# Patient Record
Sex: FEMALE | Race: OTHER | HISPANIC OR LATINO | URBAN - METROPOLITAN AREA
[De-identification: names, ages, dates, MRNs, and addresses within clinical notes are randomized per-mention and may not be internally consistent; named-entity substitution may affect disease eponyms.]

---

## 2020-07-08 ENCOUNTER — OFFICE VISIT (OUTPATIENT)
Dept: URBAN - METROPOLITAN AREA CLINIC 63 | Facility: CLINIC | Age: 55
End: 2020-07-08

## 2020-07-28 LAB — HELICOBACTER PYLORI, UREA BREATH TEST: NOT DETECTED

## 2020-08-31 ENCOUNTER — OFFICE VISIT (OUTPATIENT)
Dept: URBAN - METROPOLITAN AREA CLINIC 63 | Facility: CLINIC | Age: 55
End: 2020-08-31

## 2020-10-12 ENCOUNTER — OFFICE VISIT (OUTPATIENT)
Dept: URBAN - METROPOLITAN AREA CLINIC 60 | Facility: CLINIC | Age: 55
End: 2020-10-12

## 2020-11-04 ENCOUNTER — OFFICE VISIT (OUTPATIENT)
Dept: URBAN - METROPOLITAN AREA CLINIC 63 | Facility: CLINIC | Age: 55
End: 2020-11-04

## 2020-11-19 LAB
% SATURATION: (no result)
A-1 ANTITRYPSIN MUTATION: (no result)
ABSOLUTE BASOPHILS: (no result)
ABSOLUTE EOSINOPHILS: (no result)
ABSOLUTE LYMPHOCYTES: (no result)
ABSOLUTE MONOCYTES: (no result)
ABSOLUTE NEUTROPHILS: (no result)
ALBUMIN/GLOBULIN RATIO: (no result)
ALBUMIN: (no result)
ALKALINE PHOSPHATASE: (no result)
ALPHA FETOPROTEIN, TUMOR MARKER: (no result)
ALT: (no result)
AST: (no result)
BASOPHILS: (no result)
BILIRUBIN, TOTAL: (no result)
BUN/CREATININE RATIO: (no result)
CALCIUM: (no result)
CARBON DIOXIDE: (no result)
CERULOPLASMIN: (no result)
CHLORIDE: (no result)
CREATININE: (no result)
EGFR AFRICAN AMERICA: (no result)
EGFR NON-AFR. AMERICAN: (no result)
EOSINOPHILS: (no result)
FERRITIN: (no result)
GLOBULIN: (no result)
GLUCOSE: (no result)
HCV RNA, QUANTITATIVE REAL TI: (no result)
HCV RNA, QUANTITATIVE REAL TIME: (no result)
HEMATOCRIT: (no result)
HEMOGLOBIN: (no result)
HEPATITIS A AB, TOTAL: REACTIVE
HEPATITIS A IGM: (no result)
HEPATITIS B CORE AB TOTAL: (no result)
HEPATITIS B CORE ANTIBODY (IGM): (no result)
HEPATITIS B SURFACE ANTIBODY QL: (no result)
HEPATITIS B SURFACE ANTIGEN: (no result)
IMMUNOGLOBULIN A: (no result)
IMMUNOGLOBULIN G: (no result)
IMMUNOGLOBULIN M: (no result)
INR: 0.9
IRON BINDING CAPACITY: (no result)
IRON, TOTAL: (no result)
LYMPHOCYTES: (no result)
Lab: (no result)
MCH: (no result)
MCHC: (no result)
MCV: (no result)
MITOCHONDRIAL AB SCREEN: NEGATIVE
MONOCYTES: (no result)
MPV: (no result)
NEUTROPHILS: (no result)
PLATELET COUNT: (no result)
POTASSIUM: (no result)
PROTEIN, TOTAL: (no result)
PT: (no result)
RDW: (no result)
RED BLOOD CELL COUNT: (no result)
REFERRING PHYSICIAN: (no result)
SMOOTH MUSCLE AB SCREEN: NEGATIVE
SODIUM: (no result)
UREA NITROGEN (BUN): (no result)
WHITE BLOOD CELL COUNT: (no result)

## 2021-01-08 ENCOUNTER — OFFICE VISIT (OUTPATIENT)
Dept: URBAN - METROPOLITAN AREA SURGERY CENTER 4 | Facility: SURGERY CENTER | Age: 56
End: 2021-01-08

## 2021-01-22 ENCOUNTER — OFFICE VISIT (OUTPATIENT)
Dept: URBAN - METROPOLITAN AREA CLINIC 63 | Facility: CLINIC | Age: 56
End: 2021-01-22

## 2021-01-28 ENCOUNTER — OFFICE VISIT (OUTPATIENT)
Dept: URBAN - METROPOLITAN AREA SURGERY CENTER 4 | Facility: SURGERY CENTER | Age: 56
End: 2021-01-28

## 2021-02-12 ENCOUNTER — OFFICE VISIT (OUTPATIENT)
Dept: URBAN - METROPOLITAN AREA CLINIC 63 | Facility: CLINIC | Age: 56
End: 2021-02-12

## 2021-03-12 ENCOUNTER — OFFICE VISIT (OUTPATIENT)
Dept: URBAN - METROPOLITAN AREA CLINIC 63 | Facility: CLINIC | Age: 56
End: 2021-03-12

## 2021-05-07 ENCOUNTER — OFFICE VISIT (OUTPATIENT)
Dept: URBAN - METROPOLITAN AREA SURGERY CENTER 4 | Facility: SURGERY CENTER | Age: 56
End: 2021-05-07

## 2021-05-10 LAB — PATHOLOGY (INDENTED REPORT): (no result)

## 2021-05-21 ENCOUNTER — OFFICE VISIT (OUTPATIENT)
Dept: URBAN - METROPOLITAN AREA CLINIC 63 | Facility: CLINIC | Age: 56
End: 2021-05-21

## 2022-06-22 ENCOUNTER — OFFICE VISIT (OUTPATIENT)
Dept: URBAN - METROPOLITAN AREA CLINIC 63 | Facility: CLINIC | Age: 57
End: 2022-06-22

## 2022-06-22 ENCOUNTER — OFFICE VISIT (OUTPATIENT)
Dept: URBAN - METROPOLITAN AREA CLINIC 60 | Facility: CLINIC | Age: 57
End: 2022-06-22

## 2022-07-09 ENCOUNTER — TELEPHONE ENCOUNTER (OUTPATIENT)
Dept: URBAN - METROPOLITAN AREA CLINIC 121 | Facility: CLINIC | Age: 57
End: 2022-07-09

## 2022-07-09 RX ORDER — HYDROCHLOROTHIAZIDE 25 MG/1
TABLET ORAL ONCE A DAY
Refills: 0 | OUTPATIENT
Start: 2019-11-25 | End: 2020-07-08

## 2022-07-09 RX ORDER — OMEPRAZOLE 20 MG/1
PRN CAPSULE, DELAYED RELEASE ORAL
Refills: 0 | OUTPATIENT
Start: 2020-09-27 | End: 2021-03-12

## 2022-07-09 RX ORDER — LEVOTHYROXINE SODIUM 25 UG/1
TABLET ORAL ONCE A DAY
Refills: 0 | OUTPATIENT
Start: 2019-11-25 | End: 2020-07-08

## 2022-07-09 RX ORDER — VALSARTAN 40 MG/1
TABLET ORAL ONCE A DAY
Refills: 0 | OUTPATIENT
Start: 2019-12-03 | End: 2020-07-08

## 2022-07-09 RX ORDER — LEVOTHYROXINE SODIUM 25 UG/1
TABLET ORAL
Refills: 0 | OUTPATIENT
Start: 2020-09-27 | End: 2022-06-22

## 2022-07-09 RX ORDER — MELOXICAM 15 MG/1
TABLET ORAL
Refills: 0 | OUTPATIENT
Start: 2020-06-30 | End: 2020-10-12

## 2022-07-09 RX ORDER — SIMVASTATIN 20 MG/1
TABLET, FILM COATED ORAL
Refills: 0 | OUTPATIENT
Start: 2020-10-11 | End: 2022-06-22

## 2022-07-09 RX ORDER — SIMVASTATIN 20 MG/1
TABLET, FILM COATED ORAL ONCE A DAY
Refills: 0 | OUTPATIENT
Start: 2019-11-25 | End: 2020-07-08

## 2022-07-10 ENCOUNTER — TELEPHONE ENCOUNTER (OUTPATIENT)
Dept: URBAN - METROPOLITAN AREA CLINIC 121 | Facility: CLINIC | Age: 57
End: 2022-07-10

## 2022-07-10 RX ORDER — OMEPRAZOLE 20 MG/1
TWICE A DAY CAPSULE, DELAYED RELEASE ORAL TWICE A DAY
Refills: 1 | Status: ACTIVE | COMMUNITY
Start: 2020-02-06

## 2022-07-10 RX ORDER — OMEPRAZOLE 20 MG/1
CAPSULE, DELAYED RELEASE ORAL
Refills: 0 | Status: ACTIVE | COMMUNITY
Start: 2021-12-06

## 2022-07-10 RX ORDER — VALSARTAN 40 MG/1
TABLET ORAL ONCE A DAY
Refills: 0 | Status: ACTIVE | COMMUNITY
Start: 2020-07-08

## 2022-07-10 RX ORDER — VALSARTAN 40 MG/1
TABLET ORAL
Refills: 0 | Status: ACTIVE | COMMUNITY
Start: 2020-10-11

## 2022-07-10 RX ORDER — LEVOTHYROXINE SODIUM 25 UG/1
TABLET ORAL ONCE A DAY
Refills: 0 | Status: ACTIVE | COMMUNITY
Start: 2020-07-08

## 2022-07-10 RX ORDER — SIMVASTATIN 20 MG/1
TABLET, FILM COATED ORAL ONCE A DAY
Refills: 0 | Status: ACTIVE | COMMUNITY
Start: 2020-07-08

## 2022-07-10 RX ORDER — HYDROCHLOROTHIAZIDE 25 MG/1
TABLET ORAL ONCE A DAY
Refills: 0 | Status: ACTIVE | COMMUNITY
Start: 2020-07-08

## 2022-07-10 RX ORDER — LEVOTHYROXINE SODIUM 75 UG/1
TABLET ORAL
Refills: 0 | Status: ACTIVE | COMMUNITY
Start: 2022-05-21

## 2022-07-10 RX ORDER — AMOXICILLIN 500 MG/1
2 TWICE A DAY CAPSULE ORAL
Refills: 0 | Status: ACTIVE | COMMUNITY
Start: 2020-02-05

## 2022-07-10 RX ORDER — CLARITHROMYCIN 500 MG/1
TWICE A DAY TABLET ORAL TWICE A DAY
Refills: 0 | Status: ACTIVE | COMMUNITY
Start: 2020-02-05

## 2022-07-10 RX ORDER — HYDROCHLOROTHIAZIDE 50 MG/1
TABLET ORAL
Refills: 0 | Status: ACTIVE | COMMUNITY
Start: 2020-09-27

## 2022-07-10 RX ORDER — OMEPRAZOLE 20 MG/1
ONCE A DAY CAPSULE, DELAYED RELEASE ORAL ONCE A DAY
Refills: 1 | Status: ACTIVE | COMMUNITY
Start: 2019-12-30

## 2022-08-16 ENCOUNTER — WEB ENCOUNTER (OUTPATIENT)
Dept: URBAN - METROPOLITAN AREA SURGERY CENTER 4 | Facility: SURGERY CENTER | Age: 57
End: 2022-08-16

## 2022-08-19 ENCOUNTER — OFFICE VISIT (OUTPATIENT)
Dept: URBAN - METROPOLITAN AREA SURGERY CENTER 4 | Facility: SURGERY CENTER | Age: 57
End: 2022-08-19
Payer: COMMERCIAL

## 2022-08-19 DIAGNOSIS — K62.1 RECTAL POLYP: ICD-10-CM

## 2022-08-19 DIAGNOSIS — D12.3 POLYP OF TRANSVERSE COLON: ICD-10-CM

## 2022-08-19 DIAGNOSIS — D12.2 POLYP OF ASCENDING COLON: ICD-10-CM

## 2022-08-19 DIAGNOSIS — K57.30 DIVERTCULOSIS OF LG INT W/O PERFORATION OR ABSCESS W/O BLEEDING: ICD-10-CM

## 2022-08-19 DIAGNOSIS — Z86.010 PERSONAL HISTORY OF COLONIC POLYPS: ICD-10-CM

## 2022-08-19 PROCEDURE — 45380 COLONOSCOPY AND BIOPSY: CPT | Performed by: INTERNAL MEDICINE

## 2022-08-19 PROCEDURE — G8907 PT DOC NO EVENTS ON DISCHARG: HCPCS | Performed by: INTERNAL MEDICINE

## 2022-08-19 PROCEDURE — 45385 COLONOSCOPY W/LESION REMOVAL: CPT | Performed by: INTERNAL MEDICINE

## 2022-08-19 PROCEDURE — G8918 PT W/O PREOP ORDER IV AB PRO: HCPCS | Performed by: INTERNAL MEDICINE

## 2022-08-19 RX ORDER — VALSARTAN 40 MG/1
TABLET ORAL ONCE A DAY
Refills: 0 | Status: ACTIVE | COMMUNITY
Start: 2020-07-08

## 2022-08-19 RX ORDER — LEVOTHYROXINE SODIUM 25 UG/1
TABLET ORAL ONCE A DAY
Refills: 0 | Status: ACTIVE | COMMUNITY
Start: 2020-07-08

## 2022-08-19 RX ORDER — HYDROCHLOROTHIAZIDE 50 MG/1
TABLET ORAL
Refills: 0 | Status: ACTIVE | COMMUNITY
Start: 2020-09-27

## 2022-08-19 RX ORDER — SIMVASTATIN 20 MG/1
TABLET, FILM COATED ORAL ONCE A DAY
Refills: 0 | Status: ACTIVE | COMMUNITY
Start: 2020-07-08

## 2022-08-19 RX ORDER — LEVOTHYROXINE SODIUM 75 UG/1
TABLET ORAL
Refills: 0 | Status: ACTIVE | COMMUNITY
Start: 2022-05-21

## 2022-08-19 RX ORDER — HYDROCHLOROTHIAZIDE 25 MG/1
TABLET ORAL ONCE A DAY
Refills: 0 | Status: ACTIVE | COMMUNITY
Start: 2020-07-08

## 2022-08-19 RX ORDER — OMEPRAZOLE 20 MG/1
TWICE A DAY CAPSULE, DELAYED RELEASE ORAL TWICE A DAY
Refills: 1 | Status: ACTIVE | COMMUNITY
Start: 2020-02-06

## 2022-08-19 RX ORDER — AMOXICILLIN 500 MG/1
2 TWICE A DAY CAPSULE ORAL
Refills: 0 | Status: ACTIVE | COMMUNITY
Start: 2020-02-05

## 2022-08-19 RX ORDER — CLARITHROMYCIN 500 MG/1
TWICE A DAY TABLET ORAL TWICE A DAY
Refills: 0 | Status: ACTIVE | COMMUNITY
Start: 2020-02-05

## 2022-08-22 PROBLEM — 398050005 DIVERTICULAR DISEASE OF COLON: Status: ACTIVE | Noted: 2022-08-22

## 2022-08-26 ENCOUNTER — DASHBOARD ENCOUNTERS (OUTPATIENT)
Age: 57
End: 2022-08-26

## 2022-09-06 ENCOUNTER — OFFICE VISIT (OUTPATIENT)
Dept: URBAN - METROPOLITAN AREA TELEHEALTH 1 | Facility: TELEHEALTH | Age: 57
End: 2022-09-06

## 2022-09-06 RX ORDER — SIMVASTATIN 20 MG/1
TABLET, FILM COATED ORAL ONCE A DAY
Refills: 0 | Status: ACTIVE | COMMUNITY
Start: 2020-07-08

## 2022-09-06 RX ORDER — CLARITHROMYCIN 500 MG/1
TWICE A DAY TABLET ORAL TWICE A DAY
Refills: 0 | Status: ACTIVE | COMMUNITY
Start: 2020-02-05

## 2022-09-06 RX ORDER — OMEPRAZOLE 20 MG/1
TWICE A DAY CAPSULE, DELAYED RELEASE ORAL TWICE A DAY
Refills: 1 | Status: ACTIVE | COMMUNITY
Start: 2020-02-06

## 2022-09-06 RX ORDER — LEVOTHYROXINE SODIUM 25 UG/1
TABLET ORAL ONCE A DAY
Refills: 0 | Status: ACTIVE | COMMUNITY
Start: 2020-07-08

## 2022-09-06 RX ORDER — HYDROCHLOROTHIAZIDE 50 MG/1
TABLET ORAL
Refills: 0 | Status: ACTIVE | COMMUNITY
Start: 2020-09-27

## 2022-09-06 RX ORDER — LEVOTHYROXINE SODIUM 75 UG/1
TABLET ORAL
Refills: 0 | Status: ACTIVE | COMMUNITY
Start: 2022-05-21

## 2022-09-06 RX ORDER — HYDROCHLOROTHIAZIDE 25 MG/1
TABLET ORAL ONCE A DAY
Refills: 0 | Status: ACTIVE | COMMUNITY
Start: 2020-07-08

## 2022-09-06 RX ORDER — VALSARTAN 40 MG/1
TABLET ORAL ONCE A DAY
Refills: 0 | Status: ACTIVE | COMMUNITY
Start: 2020-07-08

## 2022-09-06 RX ORDER — AMOXICILLIN 500 MG/1
2 TWICE A DAY CAPSULE ORAL
Refills: 0 | Status: ACTIVE | COMMUNITY
Start: 2020-02-05

## 2024-09-30 ENCOUNTER — OFFICE VISIT (OUTPATIENT)
Dept: URBAN - METROPOLITAN AREA CLINIC 63 | Facility: CLINIC | Age: 59
End: 2024-09-30
Payer: COMMERCIAL

## 2024-09-30 ENCOUNTER — LAB OUTSIDE AN ENCOUNTER (OUTPATIENT)
Dept: URBAN - METROPOLITAN AREA CLINIC 63 | Facility: CLINIC | Age: 59
End: 2024-09-30

## 2024-09-30 VITALS
HEART RATE: 65 BPM | HEIGHT: 61 IN | WEIGHT: 212 LBS | OXYGEN SATURATION: 98 % | BODY MASS INDEX: 40.02 KG/M2 | DIASTOLIC BLOOD PRESSURE: 80 MMHG | SYSTOLIC BLOOD PRESSURE: 120 MMHG | TEMPERATURE: 98.3 F

## 2024-09-30 DIAGNOSIS — I10 ESSENTIAL (PRIMARY) HYPERTENSION: ICD-10-CM

## 2024-09-30 DIAGNOSIS — K29.50 UNSPECIFIED CHRONIC GASTRITIS WITHOUT BLEEDING: ICD-10-CM

## 2024-09-30 DIAGNOSIS — E66.8 EXTREME OBESITY: ICD-10-CM

## 2024-09-30 DIAGNOSIS — Z86.010 PERSONAL HISTORY OF COLONIC POLYPS: ICD-10-CM

## 2024-09-30 DIAGNOSIS — K76.0 FATTY LIVER: ICD-10-CM

## 2024-09-30 PROCEDURE — 99214 OFFICE O/P EST MOD 30 MIN: CPT | Performed by: INTERNAL MEDICINE

## 2024-09-30 RX ORDER — VALSARTAN 40 MG/1
TABLET ORAL ONCE A DAY
Refills: 0 | Status: ACTIVE | COMMUNITY
Start: 2020-07-08

## 2024-09-30 RX ORDER — LEVOTHYROXINE SODIUM 25 UG/1
TABLET ORAL ONCE A DAY
Refills: 0 | Status: ACTIVE | COMMUNITY
Start: 2020-07-08

## 2024-09-30 RX ORDER — CLARITHROMYCIN 500 MG/1
TWICE A DAY TABLET ORAL TWICE A DAY
Refills: 0 | Status: ACTIVE | COMMUNITY
Start: 2020-02-05

## 2024-09-30 RX ORDER — OMEPRAZOLE 20 MG/1
TWICE A DAY CAPSULE, DELAYED RELEASE ORAL TWICE A DAY
Refills: 1 | Status: ACTIVE | COMMUNITY
Start: 2020-02-06

## 2024-09-30 RX ORDER — HYDROCHLOROTHIAZIDE 25 MG/1
TABLET ORAL ONCE A DAY
Refills: 0 | Status: ACTIVE | COMMUNITY
Start: 2020-07-08

## 2024-09-30 NOTE — HPI-TODAY'S VISIT:
12/19 Patient here today in good general state, she is here complaining of having upper abdominal pain with symptoms of gastritis and reflux. Patient also here for her surveillance colonoscopy.  She had done colonoscopy in [place] 3 years ago.  Her colonoscopy showed evidence of a 16 mm tubular adenoma with high-grade dysplasia into the descending colon; and 2 others smaller tubular adenoma. Patient never had a revisit colonoscopy for her large tubular adenoma with high-grade dysplasia.  Apparently was a pedunculated polyps and the high-grade dysplasia was 0.4 cm from the margin.  Today patient is in good general state she denies any rectal bleeding, lose weight lower abdominal pain, or family history of colorectal cancer. Patient also said that she has right upper quadrant pain off and, medical history of elevated liver enzymes 4 years ago. Patient had recently done laboratories with the liver enzymes were normal. Patient will have colonoscopy, EGD, RUQ us, and will do treatment for gastritis and reflux. 2/20 Patient colonoscopy with evidence of 25 mm sessile serrated adenoma polyp into the proximal transverse colon, a 5 mm tubular adenoma polyp into the mid transverse colon, a 10 mm sessile serrated adenoma polyp into the distal transverse colon, two 6 mm tubular adenoma polyp into the hepatic flexure, and internal hemorrhoids. EGD shows evidence of small hiatal hernia, and chronic gastritis, duodenal mucosa with no pathologic abnormality.  Stomach, antrum body biopsy show chronic focal active gastritis positive for H. pylori infection.  Lower esophageal mucosa with reactive changes consistent with gastroesophageal reflux. Here today complaining of symptoms of gastritis and reflux. Plan: Patient will have treatment for H. pylori infection and gastritis. Colonoscopy in 1 year. 7/20 Patient here today in good general state, she is here complaining of some symptom of gastritis, mainly related with food intake.  Patient states she did finish with treatment of H. pylori infection.  Also, complaining of right upper quadrant discomfort, we will do right upper quadrant ultrasound and will check laboratories done by the patient a week ago, these labs were ordered by her PCP.  She will do diet and will have urea breath test. 10/20 Patient here today in good general state. Patient symptoms of gastritis are better.  Urea breath test is negative.  She has a previous history of high-grade dysplasia of the sigmoid colon polyp in a colonoscopy done in [place] in 2019.  Last colonoscopy done was in January 2020 shows evidence of multiple large adenoma polyps ( please see note above ).  Patient will have surveillance colonoscopy January 2021, liver work-up due to elevated ALT, as per her report chronic.  Her right upper quadrant ultrasound was negative.,  We will do FibroScan.  Her LDL cholesterol slightly elevated. continue diet for chronic gastritis. 11/20 Patient here today in good general state, has no GI complaints today, FibroScan shows evidence of borderline severe hepatic steatosis with mild to moderate fibrosis. She did not complete her laboratories yet, I will see her again after she completed colonoscopy and laboratories. 3/21 Patient is here today in good general state she has no GI complaints her laboratories shows negative viral hepatitis panel, negative autoimmune hepatitis panel negative alpha-fetoprotein, normal CBC ferritin, and ceruloplasmin.  Liver enzymes normal AST slightly elevated ALT 35, normal alkaline phosphatase and normal bilirubin.  Blood sugar 102.  Patient has medical history of fatty liver with FibroScan of CAP score 284, 7.2K PA, and fibrosis stage F1/F2. Plan: Patient will continue exercises, diet, will lose weight. We will maintain good control of lipids and blood sugar. Vitamin E 400 units daily. 5/21 Patient here today in good general state she is complaining of right flank chronic abdominal pain.  The pain appears on and off, relieved by itself.  Colonoscopy shows evidence of a 5 mm tubular adenoma polyp into the cecum, a 6 mm serrated adenoma polyp into the transverse colon, a 10 mm serrated adenoma polyp into the transverse colon, mild reticular disease of the sigmoid colon, and internal hemorrhoids. Plan: Patient will have genetic studies due to multiple large adenoma polyps in one year. Colonoscopy in 1 year. Continue diet, exercise account good control of lipid, blood sugar and weight due to her fatty liver. CT abdomen and pelvis. 6/22: Patient comes for surveillance colonoscopy, with no alarm symptoms, denies weight loss or change in her bowel habits. Will plan a colonoscopy. Will need to continue w/u for fatty liver. Will follow closely. 9/24: patient was indicared to do a colonoscopy in 2022, comes now for surveillance colonoscopy. Will plan a colonoscopy. Patient with arthritis and taking NSAID with previous h/o H pylori will do test for H pylori and will stop NSAID, increase PPI to twice a day and will conisder EGD depending on her clinical course. Patient with dfatty liver will do fibroscan. Will follow closely.

## 2024-10-21 ENCOUNTER — LAB OUTSIDE AN ENCOUNTER (OUTPATIENT)
Dept: URBAN - METROPOLITAN AREA CLINIC 63 | Facility: CLINIC | Age: 59
End: 2024-10-21

## 2024-10-22 LAB — H PYLORI BREATH TEST: NOT DETECTED

## 2024-12-09 ENCOUNTER — LAB OUTSIDE AN ENCOUNTER (OUTPATIENT)
Dept: URBAN - METROPOLITAN AREA CLINIC 63 | Facility: CLINIC | Age: 59
End: 2024-12-09

## 2025-01-02 ENCOUNTER — CLAIMS CREATED FROM THE CLAIM WINDOW (OUTPATIENT)
Dept: URBAN - METROPOLITAN AREA SURGERY CENTER 4 | Facility: SURGERY CENTER | Age: 60
End: 2025-01-02
Payer: COMMERCIAL

## 2025-01-02 ENCOUNTER — CLAIMS CREATED FROM THE CLAIM WINDOW (OUTPATIENT)
Dept: URBAN - METROPOLITAN AREA CLINIC 4 | Facility: CLINIC | Age: 60
End: 2025-01-02
Payer: COMMERCIAL

## 2025-01-02 DIAGNOSIS — K63.5 POLYP OF SIGMOID COLON, UNSPECIFIED TYPE: ICD-10-CM

## 2025-01-02 DIAGNOSIS — D12.3 BENIGN NEOPLASM OF TRANSVERSE COLON: ICD-10-CM

## 2025-01-02 DIAGNOSIS — Z86.0101 HISTORY OF ADENOMATOUS POLYP OF COLON: ICD-10-CM

## 2025-01-02 DIAGNOSIS — K63.5 SESSILE COLONIC POLYP: ICD-10-CM

## 2025-01-02 DIAGNOSIS — Z86.0101 PERSONAL HISTORY OF ADENOMATOUS AND SERRATED COLON POLYPS: ICD-10-CM

## 2025-01-02 DIAGNOSIS — K57.30 DIVERTICULOSIS OF LARGE INTESTINE WITHOUT PERFORATION OR ABSCESS WITHOUT BLEEDING: ICD-10-CM

## 2025-01-02 DIAGNOSIS — Z12.11 COLON CANCER SCREENING: ICD-10-CM

## 2025-01-02 DIAGNOSIS — D12.5 BENIGN NEOPLASM OF SIGMOID COLON: ICD-10-CM

## 2025-01-02 DIAGNOSIS — K64.1 SECOND DEGREE HEMORRHOIDS: ICD-10-CM

## 2025-01-02 DIAGNOSIS — Z86.0100 PERSONAL HISTORY OF COLON POLYPS, UNSPECIFIED: ICD-10-CM

## 2025-01-02 DIAGNOSIS — K62.1 RECTAL POLYP: ICD-10-CM

## 2025-01-02 PROCEDURE — G9998 DOC MED RSN <3 COLON: HCPCS | Performed by: INTERNAL MEDICINE

## 2025-01-02 PROCEDURE — 45385 COLONOSCOPY W/LESION REMOVAL: CPT | Performed by: INTERNAL MEDICINE

## 2025-01-02 PROCEDURE — 00812 ANES LWR INTST SCR COLSC: CPT | Performed by: NURSE ANESTHETIST, CERTIFIED REGISTERED

## 2025-01-02 PROCEDURE — 88305 TISSUE EXAM BY PATHOLOGIST: CPT | Performed by: PATHOLOGY

## 2025-01-02 PROCEDURE — 45380 COLONOSCOPY AND BIOPSY: CPT | Performed by: INTERNAL MEDICINE

## 2025-01-02 RX ORDER — LEVOTHYROXINE SODIUM 25 UG/1
TABLET ORAL ONCE A DAY
Refills: 0 | Status: ACTIVE | COMMUNITY
Start: 2020-07-08

## 2025-01-02 RX ORDER — HYDROCHLOROTHIAZIDE 25 MG/1
TABLET ORAL ONCE A DAY
Refills: 0 | Status: ACTIVE | COMMUNITY
Start: 2020-07-08

## 2025-01-02 RX ORDER — OMEPRAZOLE 20 MG/1
TWICE A DAY CAPSULE, DELAYED RELEASE ORAL TWICE A DAY
Refills: 1 | Status: ACTIVE | COMMUNITY
Start: 2020-02-06

## 2025-01-02 RX ORDER — CLARITHROMYCIN 500 MG/1
TWICE A DAY TABLET ORAL TWICE A DAY
Refills: 0 | Status: ACTIVE | COMMUNITY
Start: 2020-02-05

## 2025-01-02 RX ORDER — VALSARTAN 40 MG/1
TABLET, FILM COATED ORAL ONCE A DAY
Refills: 0 | Status: ACTIVE | COMMUNITY
Start: 2020-07-08

## 2025-02-03 ENCOUNTER — OFFICE VISIT (OUTPATIENT)
Dept: URBAN - METROPOLITAN AREA CLINIC 63 | Facility: CLINIC | Age: 60
End: 2025-02-03
Payer: COMMERCIAL

## 2025-02-03 VITALS
WEIGHT: 225 LBS | HEART RATE: 79 BPM | OXYGEN SATURATION: 98 % | BODY MASS INDEX: 42.48 KG/M2 | DIASTOLIC BLOOD PRESSURE: 86 MMHG | HEIGHT: 61 IN | SYSTOLIC BLOOD PRESSURE: 135 MMHG | TEMPERATURE: 97.9 F

## 2025-02-03 DIAGNOSIS — I10 ESSENTIAL (PRIMARY) HYPERTENSION: ICD-10-CM

## 2025-02-03 DIAGNOSIS — E78.89 OTHER LIPOPROTEIN METABOLISM DISORDERS: ICD-10-CM

## 2025-02-03 DIAGNOSIS — C18.9 ADENOCARCINOMA OF COLON: ICD-10-CM

## 2025-02-03 DIAGNOSIS — K76.0 FATTY LIVER: ICD-10-CM

## 2025-02-03 DIAGNOSIS — K29.50 UNSPECIFIED CHRONIC GASTRITIS WITHOUT BLEEDING: ICD-10-CM

## 2025-02-03 DIAGNOSIS — E66.9 ADULT-ONSET OBESITY: ICD-10-CM

## 2025-02-03 PROCEDURE — 99214 OFFICE O/P EST MOD 30 MIN: CPT | Performed by: INTERNAL MEDICINE

## 2025-02-03 RX ORDER — HYDROCHLOROTHIAZIDE 25 MG/1
TABLET ORAL ONCE A DAY
Refills: 0 | Status: ACTIVE | COMMUNITY
Start: 2020-07-08

## 2025-02-03 RX ORDER — VALSARTAN 40 MG/1
TABLET, FILM COATED ORAL ONCE A DAY
Refills: 0 | Status: ACTIVE | COMMUNITY
Start: 2020-07-08

## 2025-02-03 RX ORDER — LEVOTHYROXINE SODIUM 25 UG/1
TABLET ORAL ONCE A DAY
Refills: 0 | Status: ACTIVE | COMMUNITY
Start: 2020-07-08

## 2025-02-03 NOTE — PHYSICAL EXAM HENT:
Cynthia Head, normocephalic, atraumatic, Face, Face within normal limits, Ears, External ears within normal limits

## 2025-02-03 NOTE — HPI-TODAY'S VISIT:
12/19 Patient here today in good general state, she is here complaining of having upper abdominal pain with symptoms of gastritis and reflux. Patient also here for her surveillance colonoscopy.  She had done colonoscopy in [place] 3 years ago.  Her colonoscopy showed evidence of a 16 mm tubular adenoma with high-grade dysplasia into the descending colon; and 2 others smaller tubular adenoma. Patient never had a revisit colonoscopy for her large tubular adenoma with high-grade dysplasia.  Apparently was a pedunculated polyps and the high-grade dysplasia was 0.4 cm from the margin.  Today patient is in good general state she denies any rectal bleeding, lose weight lower abdominal pain, or family history of colorectal cancer. Patient also said that she has right upper quadrant pain off and, medical history of elevated liver enzymes 4 years ago. Patient had recently done laboratories with the liver enzymes were normal. Patient will have colonoscopy, EGD, RUQ us, and will do treatment for gastritis and reflux. 2/20 Patient colonoscopy with evidence of 25 mm sessile serrated adenoma polyp into the proximal transverse colon, a 5 mm tubular adenoma polyp into the mid transverse colon, a 10 mm sessile serrated adenoma polyp into the distal transverse colon, two 6 mm tubular adenoma polyp into the hepatic flexure, and internal hemorrhoids. EGD shows evidence of small hiatal hernia, and chronic gastritis, duodenal mucosa with no pathologic abnormality.  Stomach, antrum body biopsy show chronic focal active gastritis positive for H. pylori infection.  Lower esophageal mucosa with reactive changes consistent with gastroesophageal reflux. Here today complaining of symptoms of gastritis and reflux. Plan: Patient will have treatment for H. pylori infection and gastritis. Colonoscopy in 1 year. 7/20 Patient here today in good general state, she is here complaining of some symptom of gastritis, mainly related with food intake.  Patient states she did finish with treatment of H. pylori infection.  Also, complaining of right upper quadrant discomfort, we will do right upper quadrant ultrasound and will check laboratories done by the patient a week ago, these labs were ordered by her PCP.  She will do diet and will have urea breath test. 10/20 Patient here today in good general state. Patient symptoms of gastritis are better.  Urea breath test is negative.  She has a previous history of high-grade dysplasia of the sigmoid colon polyp in a colonoscopy done in [place] in 2019.  Last colonoscopy done was in January 2020 shows evidence of multiple large adenoma polyps ( please see note above ).  Patient will have surveillance colonoscopy January 2021, liver work-up due to elevated ALT, as per her report chronic.  Her right upper quadrant ultrasound was negative.,  We will do FibroScan.  Her LDL cholesterol slightly elevated. continue diet for chronic gastritis. 11/20 Patient here today in good general state, has no GI complaints today, FibroScan shows evidence of borderline severe hepatic steatosis with mild to moderate fibrosis. She did not complete her laboratories yet, I will see her again after she completed colonoscopy and laboratories. 3/21 Patient is here today in good general state she has no GI complaints her laboratories shows negative viral hepatitis panel, negative autoimmune hepatitis panel negative alpha-fetoprotein, normal CBC ferritin, and ceruloplasmin.  Liver enzymes normal AST slightly elevated ALT 35, normal alkaline phosphatase and normal bilirubin.  Blood sugar 102.  Patient has medical history of fatty liver with FibroScan of CAP score 284, 7.2K PA, and fibrosis stage F1/F2. Plan: Patient will continue exercises, diet, will lose weight. We will maintain good control of lipids and blood sugar. Vitamin E 400 units daily. 5/21 Patient here today in good general state she is complaining of right flank chronic abdominal pain.  The pain appears on and off, relieved by itself.  Colonoscopy shows evidence of a 5 mm tubular adenoma polyp into the cecum, a 6 mm serrated adenoma polyp into the transverse colon, a 10 mm serrated adenoma polyp into the transverse colon, mild reticular disease of the sigmoid colon, and internal hemorrhoids. Plan: Patient will have genetic studies due to multiple large adenoma polyps in one year. Colonoscopy in 1 year. Continue diet, exercise account good control of lipid, blood sugar and weight due to her fatty liver. CT abdomen and pelvis. 6/22: Patient comes for surveillance colonoscopy, with no alarm symptoms, denies weight loss or change in her bowel habits. Will plan a colonoscopy. Will need to continue w/u for fatty liver. Will follow closely. 9/24: patient was indicared to do a colonoscopy in 2022, comes now for surveillance colonoscopy. Will plan a colonoscopy. Patient with arthritis and taking NSAID with previous h/o H pylori will do test for H pylori and will stop NSAID, increase PPI to twice a day and will conisder EGD depending on her clinical course. Patient with dfatty liver will do fibroscan. Will follow closely. 2/25: Patient being seen in the office after recent colonoscopy was performed 2 January of 2025, with impression of colonic polyps.  Small polyp in the proximal colon that were removed with cold biopsy forcep, 1 small polyp between 4 and 6 mm in the sigmoid colon removed with cold biopsy forcep and a 9 mm polyp in the rectosigmoid colon removed with cold snare.  There was also visualized in the transverse colon a polyp polypectomy scar that was biopsy, diverticulosis in the sigmoid colon, and internal hemorrhoids.  Pathology report showed that the site of the previous polypectomy still positive with a tubular adenoma, polyps in the transverse colon were sessile serrated adenomas, the polyp in the sigmoid colon was a tubular adenoma and the polyp in the rectosigmoid colon was hyperplastic.  With this finding is recommended to repeat a colonoscopy in 1 year.  Patient also with evidence of diverticulosis and internal hemorrhoids education was given in that regard also to increase water and fiber in her diet patient will need to have close surveillance because of her high risk. Patient with obesity and fatty liver with mild elevation of ALT and fibroscan showed S2 and F0, moderate steatosis of the liver without fibrosis.

## 2025-02-03 NOTE — PHYSICAL EXAM CHEST:
Guidelines for Care:   Respiratory infections can be caused by viruses or bacteria. Antibiotics do not kill viruses. Symptomatic relief will help patients feel better until the body heals itself.   Start Zinc Gluconate lozenges within 24 hours of \"cold type\" symptoms; Must be in the lozenges form!! (Zinc lozenges could lessen symptoms by 4 days). Example: Cold-eeze brand sold at any pharmacy.   Get more sleep than you normally require.   Avoid strenuous workouts until cough, fever, and fatigue are gone.   Wash hands frequently, especially if you are blowing your nose, sneezing or coughing.   Cover your mouth and nose with a tissue when sneezing, coughing, or clearing your throat. Avoid being around other people when possible - respiratory infections are contagious!   Keep nasal drainage and sputum (mucus coughed up from lungs) loose so it can be more easily cleared from the nose, throat and lungs by:   -Drinking 6-8 glasses of fluid a day.   -Using a vaporizer at night. Use cool or warm mist and clean it daily.   Runny Nose:  1st-Generation Antihistamines (Provide a drying effect):   Diphenhydramine (Benadryl) may cause drowsiness: Dose 1 tablet/capsule every 4-6 hours as needed; Do not exceed 6 tablets/capsules per day.     Decongestants:   Pseudoephedrine (Sudafed) Dose: 60mg every 4-6 hours as needed; Do not exceed 240mg per day. Or or 120 mg every 12 hours.   Plain decongestants (e.g.,Sudafed) or a decongestant combined with an expectorant are all that is necessary to relieve stuffy head and nose. Decongestants may have a caffeine-like effect and should be used with caution at bedtime or in those with high blood pressure.   Caution: Sudafed/decongestents can typically exacerbate heart rhythm issues or increase blood pressure.     Intranasal Decongestants (Do not use more than 3-5 days):  -Phenylephrine (Lmabert-Synephrine, Dristan)  -Oxymetazoline (Afrin, Dristan 12 hour, Allerest 12-Hour).  People with high blood  chest wall non-tender, breathing is unlabored without accessory muscle use, normal breath sounds pressure can use intranasal decongestants, Coricidin products, Claritin, and cough drops safely.     Also: For a clear runny nose:   Ipatroprium (Atrovent) 2 sprays each side of your nose 4 times per day will relieve your symptoms. Blow your nose out well before using.   Astelin (Astepro): Two sprays in each nostril twice daily.   Intranasal: Flonase: Initial:Two sprays (50 mcg/spray) per nostril once daily (200 mcg/day); alternatively, the same total daily dosage may be divided and given as 1 spray per nostril twice daily (200 mcg/day). After the first few days, dosage may be reduced to 1 spray per nostril once daily for maintenance therapy (100 mcg/day) (maximum: 2 sprays in each nostril [200 mcg]/day). Helpful only if concomitant allergic rhinitis.       Sore Throat:   A sore throat can be caused by a virus, bacteria, post nasal drip or exposure to irritating chemicals or smoke. Keep the throat moist throughout the day and night. This will help to ease the discomfort. Sore throats typically become more sore at night. To improve comfort of a sore throat:   Gargle often with warm salt water (1/2 teaspoon table salt in 8 oz. warm water) or dissolve two \"baby aspirin\" (81MG) in warm water and gargle.   Run a vaporizer by the bedside.   Sip on fluids (warm or cold, whichever feels better) throughout the day and night.   Eat foods that are soft and glide down easily (broth, jello, popsicles, ice cream, yogurt, pudding, etc).   Use over the counter throat lozenges and sprays which offer temporary relief and typically contain a local anesthetic, such as Cepacol cough drops, Ricola cough drops, or Chloraseptic spray.   Over the counter Maalox mixed with the liquid \"Baby\" Benadryl (Mix equal parts): Take 1 tablespoon before meals or as needed for throat pain. May use three times per day.    Cough:   Encourage coughing, do not try to suppress it if you are coughing up lung mucus (sputum).   If coughing interrupts sleep,  take a cough medicine only occasionally at bedtime (e.g. Robitussin DM or generic store brand).  Dextromethorphan (Delsym): Take 10mls every 12 hours as needed; Do not exceed 20mls in 24 hours. Do not wash the cough medicine down with water as this reduces its effectiveness.  Naproxen (Aleve) 440 mg; every 12 hours; Naproxen blocks inflammation that stimulates cough-provoking nerves; also helps headache, malaise, and myalgia (muscle pain).   Stop smoking (if you smoke) and avoid breathing any irritating substances.   Hot liquids (not scalding) sometimes help relieve coughing.   Activity, talking, and exposure to cold temperatures increases coughing.   Vitamin C and Airborne have the potential to decrease the severity and duration of upper respiratory infections.   Expectorant:   Guaifenesin (Mucinex) Dose: 200mg-400mg every 4 hours as needed; or 600mg-1200mg every 12 hours as extended release; Do not exceed 2400mg per day   Mucinex can also help loosen and thin the Mucous in the throat as well as the nose. Maintain fluid intake 1500-2000mls per day to decrease secretions.     Over the counter medicines (OTC): Fever, cough, nasal congestion, sore throat, headache, and myalgia (muscle pain) treatment:   Analgesic:   Acetaminophen (Tylenol) Dose: 500mg-1000mg every 4-6 hours as needed;   Do not exceed 3000mg per day.   Tylenol (acetaminophen) may be used for fever over 100 F and/or body aches and general discomfort. Liquid Tylenol (adult strength) may have some additional benefit for relieving a sore throat as it coats the throat area.   NSAIDs Anti-inflammatory (take with food):   Naproxen (Aleve) may also decrease cough. Dose: 440mg (2 tablets) every 12 hours as needed; Do not exceed 1100mg per day.   or   Ibuprofen (Motrin, Advil): Adults: 400 to 600 mg every 4-6 hours as needed by mouth while symptoms persist; not to exceed 3.2 g/ day.     If an antibiotic is prescribed for you:   Take it exactly as prescribed.  Take all of the daily doses until the medicine is gone.   If you should develop nausea, diarrhea, skin rash or hives (raised itchy red spots on the skin) stop taking the antibiotics and call the clinic. The antibiotic may cause diarrhea. You can help prevent diarrhea by eating yogurt with active cultures or taking a probiotic such as Lactobacillus 1 tablet three times per day or take Floragen 3 (ask the pharmacist, as this is kept in the refrigerator). If diarrhea does develop, it is safe to use over-the-counter imodium as directed on the package. Please notify my office if the diarrhea persists after you finish the antibiotic.     Return or go to the ER if you develop any of the following:   Fever over 101 F.   Hard shaking chills.   Severe headache.   Mucus getting thicker and changing color from clear to yellowish green.   Chest pain or shortness of breath.   Nausea, vomiting or diarrhea lasting longer than 24 hours.   Skin rash.   Severe sore throat causing difficulty opening your mouth or swallowing.